# Patient Record
Sex: MALE | Race: WHITE | URBAN - METROPOLITAN AREA
[De-identification: names, ages, dates, MRNs, and addresses within clinical notes are randomized per-mention and may not be internally consistent; named-entity substitution may affect disease eponyms.]

---
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
## 2022-03-18 NOTE — HPI: MEDICATION (ACCUTANE)
How Severe Is It?: moderate
Mom left message on VM asked that she be given a call back She has  questions about low iron levels 
S/w mother, the apartment they are staying with has mold, advised to report to [facility] 
Is This A New Presentation, Or A Follow-Up?: Follow Up Accutane

## 2024-09-20 NOTE — PROCEDURE: ISOTRETINOIN MONITORING
Patient with issues with coughing and swallowing so Speech therapy consulted for evaluation. Patient evaluated and monitored and improved. Initially placed on soft diet but on 9/18 upgraded to regular diet with thin liquids as per Speech. Dysphagia resolved.      Dosing Month 7 (Required For Cumulative Dosing): 40mg Daily